# Patient Record
Sex: MALE | Race: WHITE
[De-identification: names, ages, dates, MRNs, and addresses within clinical notes are randomized per-mention and may not be internally consistent; named-entity substitution may affect disease eponyms.]

---

## 2017-02-21 ENCOUNTER — HOSPITAL ENCOUNTER (OUTPATIENT)
Dept: HOSPITAL 61 - PCVCCLINIC | Age: 72
Discharge: HOME | End: 2017-02-21
Attending: INTERNAL MEDICINE
Payer: MEDICARE

## 2017-02-21 DIAGNOSIS — I45.10: ICD-10-CM

## 2017-02-21 DIAGNOSIS — E78.00: Primary | ICD-10-CM

## 2017-02-21 DIAGNOSIS — I10: ICD-10-CM

## 2017-02-21 DIAGNOSIS — I25.10: ICD-10-CM

## 2017-02-21 PROCEDURE — G0463 HOSPITAL OUTPT CLINIC VISIT: HCPCS

## 2017-02-21 PROCEDURE — 80061 LIPID PANEL: CPT

## 2017-11-13 ENCOUNTER — HOSPITAL ENCOUNTER (OUTPATIENT)
Dept: HOSPITAL 61 - PCVCCLINIC | Age: 72
Discharge: HOME | End: 2017-11-13
Attending: INTERNAL MEDICINE
Payer: MEDICARE

## 2017-11-13 DIAGNOSIS — E78.5: ICD-10-CM

## 2017-11-13 DIAGNOSIS — Z79.82: ICD-10-CM

## 2017-11-13 DIAGNOSIS — Z79.899: ICD-10-CM

## 2017-11-13 DIAGNOSIS — R06.09: ICD-10-CM

## 2017-11-13 DIAGNOSIS — R94.31: ICD-10-CM

## 2017-11-13 DIAGNOSIS — R68.89: ICD-10-CM

## 2017-11-13 DIAGNOSIS — R09.89: ICD-10-CM

## 2017-11-13 DIAGNOSIS — I10: ICD-10-CM

## 2017-11-13 DIAGNOSIS — Z87.891: ICD-10-CM

## 2017-11-13 DIAGNOSIS — R00.1: ICD-10-CM

## 2017-11-13 DIAGNOSIS — I25.10: Primary | ICD-10-CM

## 2017-11-13 PROCEDURE — 93005 ELECTROCARDIOGRAM TRACING: CPT

## 2017-11-13 PROCEDURE — G0463 HOSPITAL OUTPT CLINIC VISIT: HCPCS

## 2017-11-13 PROCEDURE — 80061 LIPID PANEL: CPT

## 2017-12-13 ENCOUNTER — HOSPITAL ENCOUNTER (OUTPATIENT)
Dept: HOSPITAL 61 - PCVCIMAG | Age: 72
Discharge: HOME | End: 2017-12-13
Attending: INTERNAL MEDICINE
Payer: MEDICARE

## 2017-12-13 DIAGNOSIS — I65.23: ICD-10-CM

## 2017-12-13 DIAGNOSIS — I10: ICD-10-CM

## 2017-12-13 DIAGNOSIS — R06.00: ICD-10-CM

## 2017-12-13 DIAGNOSIS — Z95.5: ICD-10-CM

## 2017-12-13 DIAGNOSIS — I25.10: Primary | ICD-10-CM

## 2017-12-13 DIAGNOSIS — E78.5: ICD-10-CM

## 2017-12-13 PROCEDURE — 93351 STRESS TTE COMPLETE: CPT

## 2017-12-13 PROCEDURE — 93325 DOPPLER ECHO COLOR FLOW MAPG: CPT

## 2017-12-13 PROCEDURE — 93880 EXTRACRANIAL BILAT STUDY: CPT

## 2017-12-13 NOTE — PCVCIMAG
--------------- APPROVED REPORT --------------





Exam:  Stress Echocardiogram

Indication: CAD, STENT, HYPERTENSION, HYPERLIPIDEMIA, DYSPNEA

Patient Location: Echo lab

Stress Nurse: Briseyda Carl RN

Status: routine



Ht: 5 ft 9 in  

HR: 65 bpm      BP: 122/70 mmHg

Rhythm: NSR



Procedure

The patient underwent an Exercise Stress Test using the Jony 

Protocol. Blood pressure, heart rate, and EKG were monitored.

An Echocardiogram was performed by technician in four stages in quad 

fashion.  At peak stress, four selected images were obtained and 

placed side by side with resting images for comparison.



Stress Test Details

Stress Test:  Exercise stress testing was performed using a Jony 

protocol.

HR

Resting HR:            65 bpmMax Heart Rate (APMHR): 148 bpm 

Max HR Achieved:  148 bpmTarget HR (85% APMHR): 125 bpm

% of APMHR:         100

HR response to stress: Normal HR response to stress



BP

Resting BP:  122/70 mmHg

Max BP:       170/66 mmHg



ECG

Resting ECG:  Sinus Rhythm

Stress ECG:     RBBB

Recovery ECG: RBBB



Clinical

Reason for Termination: Maximal effort

Exercise duration: 12 min sec

Highest Stage Achieved: Stage 5: 5.0 mph at 18% grade. 

Exercise capacity: 13.40 METs

Overall Exercise Capacity for Age: Good



Pre-Stress Echo

The resting Echocardiogram showed normal left ventricular 

contractility with an estimated Ejection Fraction of about 55-60%. 

Normal wall motion in all segments on baseline images.



Post-Stress Echo

The stress Echocardiogram showed normal left ventricular 

contractility with an estimated Ejection Fraction of about 60-65%. 

Normal augmentation of wall motion in all segments on post stress 

images.



Clinical

No clinical or ECG evidence for ischemia.



Conclusion

Clinical Response:  Non-ischemic

Exercise Capacity:  Superior

Stress ECG Response:  Non-ischemic

Stress Echo Images:  Non-ischemic

The left ventricle is normal in size and wall thickness in both the 

rest and stress images.



Other Information

Study Quality: Good



&lt;Conclusion&gt;

The left ventricle is normal in size and wall thickness in both the 

rest and stress images.

## 2018-09-13 ENCOUNTER — HOSPITAL ENCOUNTER (OUTPATIENT)
Dept: HOSPITAL 61 - PCVCCLINIC | Age: 73
Discharge: HOME | End: 2018-09-13
Attending: INTERNAL MEDICINE
Payer: COMMERCIAL

## 2018-09-13 DIAGNOSIS — Z79.82: ICD-10-CM

## 2018-09-13 DIAGNOSIS — M19.90: ICD-10-CM

## 2018-09-13 DIAGNOSIS — Z87.891: ICD-10-CM

## 2018-09-13 DIAGNOSIS — I25.10: Primary | ICD-10-CM

## 2018-09-13 DIAGNOSIS — E78.00: ICD-10-CM

## 2018-09-13 DIAGNOSIS — I10: ICD-10-CM

## 2018-09-13 PROCEDURE — G0463 HOSPITAL OUTPT CLINIC VISIT: HCPCS

## 2018-09-13 PROCEDURE — 80061 LIPID PANEL: CPT

## 2018-09-13 PROCEDURE — 93005 ELECTROCARDIOGRAM TRACING: CPT

## 2019-05-08 ENCOUNTER — HOSPITAL ENCOUNTER (OUTPATIENT)
Dept: HOSPITAL 61 - PCVCCLINIC | Age: 74
Discharge: HOME | End: 2019-05-08
Attending: INTERNAL MEDICINE
Payer: COMMERCIAL

## 2019-05-08 DIAGNOSIS — I25.10: Primary | ICD-10-CM

## 2019-05-08 DIAGNOSIS — E78.00: ICD-10-CM

## 2019-05-08 DIAGNOSIS — Z79.899: ICD-10-CM

## 2019-05-08 DIAGNOSIS — Z79.82: ICD-10-CM

## 2019-05-08 DIAGNOSIS — I65.23: ICD-10-CM

## 2019-05-08 DIAGNOSIS — I10: ICD-10-CM

## 2019-05-08 DIAGNOSIS — Z87.891: ICD-10-CM

## 2019-05-08 PROCEDURE — G0463 HOSPITAL OUTPT CLINIC VISIT: HCPCS

## 2019-05-08 PROCEDURE — 36415 COLL VENOUS BLD VENIPUNCTURE: CPT

## 2019-05-08 PROCEDURE — 80061 LIPID PANEL: CPT

## 2019-05-08 PROCEDURE — 93005 ELECTROCARDIOGRAM TRACING: CPT

## 2020-01-08 ENCOUNTER — HOSPITAL ENCOUNTER (OUTPATIENT)
Dept: HOSPITAL 35 - SJCVC | Age: 75
End: 2020-01-08
Attending: INTERNAL MEDICINE
Payer: COMMERCIAL

## 2020-01-08 DIAGNOSIS — R94.31: Primary | ICD-10-CM

## 2020-01-08 DIAGNOSIS — R68.89: ICD-10-CM

## 2020-01-08 DIAGNOSIS — I45.10: ICD-10-CM

## 2020-01-08 DIAGNOSIS — I25.10: ICD-10-CM

## 2020-01-08 DIAGNOSIS — Z87.891: ICD-10-CM

## 2020-01-08 DIAGNOSIS — E78.00: ICD-10-CM

## 2020-01-08 DIAGNOSIS — I65.23: ICD-10-CM

## 2020-01-08 DIAGNOSIS — Z79.899: ICD-10-CM

## 2020-01-08 DIAGNOSIS — I10: ICD-10-CM

## 2020-01-08 DIAGNOSIS — Z79.82: ICD-10-CM

## 2020-02-19 ENCOUNTER — HOSPITAL ENCOUNTER (OUTPATIENT)
Dept: HOSPITAL 35 - SJCVCIMAG | Age: 75
End: 2020-02-19
Attending: INTERNAL MEDICINE
Payer: COMMERCIAL

## 2020-02-19 DIAGNOSIS — E04.2: ICD-10-CM

## 2020-02-19 DIAGNOSIS — I65.23: Primary | ICD-10-CM

## 2021-06-22 ENCOUNTER — HOSPITAL ENCOUNTER (OUTPATIENT)
Dept: HOSPITAL 35 - SJCVCIMAG | Age: 76
End: 2021-06-22
Attending: INTERNAL MEDICINE
Payer: COMMERCIAL

## 2021-06-22 DIAGNOSIS — I25.10: ICD-10-CM

## 2021-06-22 DIAGNOSIS — I34.0: Primary | ICD-10-CM
